# Patient Record
Sex: MALE | Race: WHITE
[De-identification: names, ages, dates, MRNs, and addresses within clinical notes are randomized per-mention and may not be internally consistent; named-entity substitution may affect disease eponyms.]

---

## 2021-06-11 ENCOUNTER — HOSPITAL ENCOUNTER (EMERGENCY)
Dept: HOSPITAL 8 - ED | Age: 31
LOS: 1 days | Discharge: PSYCHIATRIC OR SUBSTANCE USE FACILITY | End: 2021-06-12
Payer: MEDICAID

## 2021-06-11 VITALS — WEIGHT: 165.35 LBS | BODY MASS INDEX: 24.49 KG/M2 | HEIGHT: 69 IN

## 2021-06-11 DIAGNOSIS — R45.851: Primary | ICD-10-CM

## 2021-06-11 DIAGNOSIS — F48.2: ICD-10-CM

## 2021-06-11 DIAGNOSIS — R94.31: ICD-10-CM

## 2021-06-11 DIAGNOSIS — Z20.822: ICD-10-CM

## 2021-06-11 LAB
ALBUMIN SERPL-MCNC: 3.9 G/DL (ref 3.4–5)
ALP SERPL-CCNC: 128 U/L (ref 45–117)
ALT SERPL-CCNC: 34 U/L (ref 12–78)
ANION GAP SERPL CALC-SCNC: 5 MMOL/L (ref 5–15)
BASOPHILS # BLD AUTO: 0.1 X10^3/UL (ref 0–0.1)
BASOPHILS NFR BLD AUTO: 1 % (ref 0–1)
BILIRUB SERPL-MCNC: 0.3 MG/DL (ref 0.2–1)
CALCIUM SERPL-MCNC: 9.1 MG/DL (ref 8.5–10.1)
CHLORIDE SERPL-SCNC: 108 MMOL/L (ref 98–107)
CREAT SERPL-MCNC: 0.8 MG/DL (ref 0.7–1.3)
EOSINOPHIL # BLD AUTO: 0.6 X10^3/UL (ref 0–0.4)
EOSINOPHIL NFR BLD AUTO: 6 % (ref 1–7)
ERYTHROCYTE [DISTWIDTH] IN BLOOD BY AUTOMATED COUNT: 17.4 % (ref 9.4–14.8)
LYMPHOCYTES # BLD AUTO: 1.8 X10^3/UL (ref 1–3.4)
LYMPHOCYTES NFR BLD AUTO: 20 % (ref 22–44)
MCH RBC QN AUTO: 32.3 PG (ref 27.5–34.5)
MCHC RBC AUTO-ENTMCNC: 34.3 G/DL (ref 33.2–36.2)
MONOCYTES # BLD AUTO: 0.5 X10^3/UL (ref 0.2–0.8)
MONOCYTES NFR BLD AUTO: 5 % (ref 2–9)
NEUTROPHILS # BLD AUTO: 6.1 X10^3/UL (ref 1.8–6.8)
NEUTROPHILS NFR BLD AUTO: 68 % (ref 42–75)
PLATELET # BLD AUTO: 218 X10^3/UL (ref 130–400)
PMV BLD AUTO: 8.7 FL (ref 7.4–10.4)
PROT SERPL-MCNC: 7.8 G/DL (ref 6.4–8.2)
RBC # BLD AUTO: 4.04 X10^6/UL (ref 4.38–5.82)
VANCOMYCIN TROUGH SERPL-MCNC: < 1.7 MG/DL (ref 2.8–20)

## 2021-06-11 PROCEDURE — 80320 DRUG SCREEN QUANTALCOHOLS: CPT

## 2021-06-11 PROCEDURE — 80053 COMPREHEN METABOLIC PANEL: CPT

## 2021-06-11 PROCEDURE — 87426 SARSCOV CORONAVIRUS AG IA: CPT

## 2021-06-11 PROCEDURE — 80299 QUANTITATIVE ASSAY DRUG: CPT

## 2021-06-11 PROCEDURE — 93005 ELECTROCARDIOGRAM TRACING: CPT

## 2021-06-11 PROCEDURE — 99285 EMERGENCY DEPT VISIT HI MDM: CPT

## 2021-06-11 PROCEDURE — 80307 DRUG TEST PRSMV CHEM ANLYZR: CPT

## 2021-06-11 PROCEDURE — 80329 ANALGESICS NON-OPIOID 1 OR 2: CPT

## 2021-06-11 PROCEDURE — 36415 COLL VENOUS BLD VENIPUNCTURE: CPT

## 2021-06-11 PROCEDURE — G0480 DRUG TEST DEF 1-7 CLASSES: HCPCS

## 2021-06-11 PROCEDURE — 85025 COMPLETE CBC W/AUTO DIFF WBC: CPT

## 2021-06-11 NOTE — NUR
PT VOMITTED IN ENMESIS BAG AND BLANKETS. GIVEN NEW BLANKETS. PT FEELING BETTER 
NOW. NO ADDITIONAL NEEDS AT THIS TIME.

## 2021-06-11 NOTE — NUR
PT SLEEPING IN BED. NADN. NO MORE VOMITTING. BREATHING EVEN AND UNLABORED.

SITTER AT BEDSIDE FOR SFAETY MONITORING. BED IN LOW POSITION. RAILS ENGAGED.

CALL LIGHT ON LAP. WCTM.

## 2021-06-11 NOTE — NUR
PT GIVEN WATER. REFUSED FOOD WHEN OFFERED TO PT.

PT RESTING IN BED. BREATHING EVEN AND UNLABORED. NADN.

RAILS ENGAGED. BED IN LOW POSITION. SAFETY GARAGE DOORS SECURED.

SITTER OUTSIDE ROOM FOR SAFETY MONITORING. CALL LIGHT ON LAP.

NO ADDITIONAL NEEDS OR QUESTIONS AT THIS TIME. TM

## 2021-06-11 NOTE — NUR
DORINDA STERLING DROPPED PT OFF. CALL WHEN RELEASED TO BE PICKED UP. 

[phone] [facility], ASK FOR DORINDA

## 2021-06-11 NOTE — NUR
PT SLEEPING IN BED COMFORTABLY. NADN. BREATHING EVEN AND UNLABORED.

SITTER AT BEDSIDE FOR SAFETY MONITORING. BED IN LOW POSITION. RAILS ENGAGED.

CALL LIGHT ON LAP. WCTM.

## 2021-06-11 NOTE — NUR
PT C/O OF SI AND DENIES HI.  PT REPORTS PLAN OF HURTING HIMSELD BY PLANS OF 
JUMPING OUT OF A WINDOW. STATES HE HAS HAD NO INTENTION OF CARRYING OUT WITH 
THIS PLAN AND THAT THEY ARE JUST THOUGHTS. STATES HE IS HEARING VOICES "TALKING 
SHIT TO ME" 

REPORTS BEING VERY DEPRESSED ABOUT REHAB AND ABOUT 2 KIDS HE HAS.



PT STATES PAST SUICIDE ATTEMPT WAS 20 YEARS AGO OF SLITTING HIS WRIST. 

HX OF CIRRHOSIS, BIPOLAR, SCHIZ, AND DEPRESSION. 

PT IS FROM [place] AND HAS BEEN IS [place] [facility] PAST 5 DAYS [facility].

REPORTS REHAB OF 7 DAYS WITHOUT DRINKING, DOING METH, AND SMOKING MARIJUANA



PT VITALS ALL STABLE, SITTER AT BEDSIDE. BELONGINGS LOCKED IN LOCKERS AND FPORM 
COMPLETED SIGNED BY PT. SAFETY GARAGE DOORS CLOSED. BED IN LOW POSITION. RAILS 
ENGAGED. CALL LIGHT ON LAP. [facility]

## 2021-06-12 ENCOUNTER — HOSPITAL ENCOUNTER (INPATIENT)
Dept: HOSPITAL 8 - 3E | Age: 31
LOS: 5 days | Discharge: HOME | DRG: 885 | End: 2021-06-17
Attending: PSYCHIATRY & NEUROLOGY | Admitting: PSYCHIATRY & NEUROLOGY
Payer: MEDICAID

## 2021-06-12 VITALS — DIASTOLIC BLOOD PRESSURE: 67 MMHG | SYSTOLIC BLOOD PRESSURE: 106 MMHG

## 2021-06-12 VITALS — BODY MASS INDEX: 21.19 KG/M2 | WEIGHT: 143.08 LBS | HEIGHT: 69 IN

## 2021-06-12 VITALS — DIASTOLIC BLOOD PRESSURE: 69 MMHG | SYSTOLIC BLOOD PRESSURE: 107 MMHG

## 2021-06-12 VITALS — SYSTOLIC BLOOD PRESSURE: 116 MMHG | DIASTOLIC BLOOD PRESSURE: 71 MMHG

## 2021-06-12 DIAGNOSIS — G89.29: ICD-10-CM

## 2021-06-12 DIAGNOSIS — F12.10: ICD-10-CM

## 2021-06-12 DIAGNOSIS — G47.00: ICD-10-CM

## 2021-06-12 DIAGNOSIS — F10.21: ICD-10-CM

## 2021-06-12 DIAGNOSIS — Z23: ICD-10-CM

## 2021-06-12 DIAGNOSIS — F25.0: Primary | ICD-10-CM

## 2021-06-12 DIAGNOSIS — F17.210: ICD-10-CM

## 2021-06-12 DIAGNOSIS — F11.20: ICD-10-CM

## 2021-06-12 DIAGNOSIS — F15.21: ICD-10-CM

## 2021-06-12 DIAGNOSIS — F41.9: ICD-10-CM

## 2021-06-12 LAB — MICROSCOPIC: (no result)

## 2021-06-12 PROCEDURE — 80061 LIPID PANEL: CPT

## 2021-06-12 PROCEDURE — 93005 ELECTROCARDIOGRAM TRACING: CPT

## 2021-06-12 PROCEDURE — 36415 COLL VENOUS BLD VENIPUNCTURE: CPT

## 2021-06-12 PROCEDURE — 80320 DRUG SCREEN QUANTALCOHOLS: CPT

## 2021-06-12 PROCEDURE — 99285 EMERGENCY DEPT VISIT HI MDM: CPT

## 2021-06-12 PROCEDURE — 80053 COMPREHEN METABOLIC PANEL: CPT

## 2021-06-12 PROCEDURE — G0480 DRUG TEST DEF 1-7 CLASSES: HCPCS

## 2021-06-12 PROCEDURE — 80299 QUANTITATIVE ASSAY DRUG: CPT

## 2021-06-12 PROCEDURE — 71045 X-RAY EXAM CHEST 1 VIEW: CPT

## 2021-06-12 PROCEDURE — 80307 DRUG TEST PRSMV CHEM ANLYZR: CPT

## 2021-06-12 PROCEDURE — 85025 COMPLETE CBC W/AUTO DIFF WBC: CPT

## 2021-06-12 PROCEDURE — 81003 URINALYSIS AUTO W/O SCOPE: CPT

## 2021-06-12 PROCEDURE — 91303: CPT

## 2021-06-12 PROCEDURE — 80329 ANALGESICS NON-OPIOID 1 OR 2: CPT

## 2021-06-12 PROCEDURE — 87426 SARSCOV CORONAVIRUS AG IA: CPT

## 2021-06-12 RX ADMIN — TRAZODONE HYDROCHLORIDE PRN MG: 50 TABLET ORAL at 20:14

## 2021-06-12 RX ADMIN — NICOTINE PRN PATCH: 14 PATCH, EXTENDED RELEASE TRANSDERMAL at 18:20

## 2021-06-12 NOTE — NUR
CALL TO LAB TO INQUIRE ON DELAY OF RESULT OF COVID.  LAB DELAY, ESTIMATED TIME 
FOR RESULT 45 MINUTES.

## 2021-06-12 NOTE — NUR
BREAK RN: PT RESTING COMFORTABLY IN [facility], [facility], NO CHANGE IN CONDITION, LIGHTS 
OFF FOR COMFORT, ROOM SECURED, SITTER IN LINE OF SIGHT. WCTM

## 2021-06-12 NOTE — NUR
PT SLEEPING IN BED. EYES CLOSED. NO CONDITION CHANGE. BREATHING EVEN AND 
UNLABORED. NADN. SITTER IN LINE OF SIGHT FOR SAFETY MONITORING. GARAGE DOORS 
SECURED.

BED IN LOW POSIITON. RAILS ENGAGED. CALL LIGHT ON LAP. [facility]BECA.

## 2021-06-12 NOTE — NUR
PT GIVEN HOSPITAL BED FOR INCREASED COMFORTABILITY.

PT RESTING IN BED WATCHING TV. 

SITTER OUTSIDE ROOM FOR SAFETY MONITORING. BREATHING EVEN AND UNLABORED.

NADN. BED IN LOW POSITION. RAILS ENGAGED. CALL LIGHT ON LAP. GARAGE DOORS 
SECURED.

WCTM.

## 2021-06-12 NOTE — NUR
MEAL TRAY PROVIDED.  PT DENIES SI, AH, VH AT THIS TIME.  WHEN ASKED IF PT WOULD 
LIKE TO GO BACK TO [facility], PT STATES "I WANT TO CHECK OUT THE OTHER 
PL[facility]".  PT COOPERATIVE WITH CARE.  VS RECHECKED/UPDATED 
IN COMPUTER.  PHYSICAL REASSESSMENT COMPLETE.

## 2021-06-12 NOTE — NUR
THROUGHPUT: DENIED BY [facility] [[email or web address]] D/T INS PER [place]; PACKET FAXED TO [facility].

## 2021-06-12 NOTE — NUR
PT A POOR HISTORIAN FOR DAILY MEDICATIONS. STATES HE TAKES ONE MED FOR 
PSYCH/DEPRESSION BUT DOES NOT KNOW WHAT IT IS.

## 2021-06-12 NOTE — NUR
THROUGHPUT:  PT HAS MEDICAID INSURANCE PER [employer], FAXED REFERAL TO [facility].

## 2021-06-12 NOTE — NUR
AWAITING [facility] AVAILABILITY.  PT CALM, COOPERATIVE WITH CARE.  UP AS NEEDED TO 
BR, SLEEPING INTERMITTENTLY.  SITTER AT DOORWAY.

## 2021-06-13 VITALS — SYSTOLIC BLOOD PRESSURE: 116 MMHG | DIASTOLIC BLOOD PRESSURE: 70 MMHG

## 2021-06-13 VITALS — DIASTOLIC BLOOD PRESSURE: 69 MMHG | SYSTOLIC BLOOD PRESSURE: 102 MMHG

## 2021-06-13 LAB
CHOL/HDL RATIO: 2.5
HDL CHOL %: 39 % (ref 26–37)
HDL CHOLESTEROL (DIRECT): 61 MG/DL (ref 40–60)
LDL CHOLESTEROL,CALCULATED: 83 MG/DL (ref 54–169)
LDLC/HDLC SERPL: 1.4 {RATIO} (ref 0.5–3)
TRIGL SERPL-MCNC: 54 MG/DL (ref 50–200)
VLDLC SERPL CALC-MCNC: 11 MG/DL (ref 0–25)

## 2021-06-13 RX ADMIN — QUETIAPINE FUMARATE SCH MG: 25 TABLET ORAL at 20:36

## 2021-06-13 RX ADMIN — OLANZAPINE SCH MG: 10 TABLET, FILM COATED ORAL at 08:51

## 2021-06-13 RX ADMIN — NICOTINE PRN PATCH: 14 PATCH, EXTENDED RELEASE TRANSDERMAL at 20:37

## 2021-06-13 RX ADMIN — GABAPENTIN SCH MG: 300 CAPSULE ORAL at 16:28

## 2021-06-13 RX ADMIN — CITALOPRAM HYDROBROMIDE SCH MG: 20 TABLET ORAL at 13:51

## 2021-06-13 RX ADMIN — GABAPENTIN SCH MG: 300 CAPSULE ORAL at 20:36

## 2021-06-14 VITALS — DIASTOLIC BLOOD PRESSURE: 70 MMHG | SYSTOLIC BLOOD PRESSURE: 103 MMHG

## 2021-06-14 VITALS — SYSTOLIC BLOOD PRESSURE: 112 MMHG | DIASTOLIC BLOOD PRESSURE: 72 MMHG

## 2021-06-14 RX ADMIN — QUETIAPINE FUMARATE SCH MG: 25 TABLET ORAL at 20:19

## 2021-06-14 RX ADMIN — GABAPENTIN SCH MG: 300 CAPSULE ORAL at 16:17

## 2021-06-14 RX ADMIN — OLANZAPINE SCH MG: 10 TABLET, FILM COATED ORAL at 09:02

## 2021-06-14 RX ADMIN — CITALOPRAM HYDROBROMIDE SCH MG: 20 TABLET ORAL at 09:02

## 2021-06-14 RX ADMIN — GABAPENTIN SCH MG: 300 CAPSULE ORAL at 20:19

## 2021-06-14 RX ADMIN — GABAPENTIN SCH MG: 300 CAPSULE ORAL at 09:02

## 2021-06-14 RX ADMIN — TRAZODONE HYDROCHLORIDE PRN MG: 50 TABLET ORAL at 20:19

## 2021-06-15 VITALS — DIASTOLIC BLOOD PRESSURE: 68 MMHG | SYSTOLIC BLOOD PRESSURE: 109 MMHG

## 2021-06-15 VITALS — DIASTOLIC BLOOD PRESSURE: 64 MMHG | SYSTOLIC BLOOD PRESSURE: 105 MMHG

## 2021-06-15 RX ADMIN — GABAPENTIN SCH MG: 300 CAPSULE ORAL at 20:38

## 2021-06-15 RX ADMIN — DOXEPIN HYDROCHLORIDE SCH MG: 25 CAPSULE ORAL at 20:38

## 2021-06-15 RX ADMIN — GABAPENTIN SCH MG: 300 CAPSULE ORAL at 08:22

## 2021-06-15 RX ADMIN — QUETIAPINE FUMARATE SCH MG: 25 TABLET ORAL at 20:38

## 2021-06-15 RX ADMIN — NICOTINE PRN PATCH: 14 PATCH, EXTENDED RELEASE TRANSDERMAL at 10:41

## 2021-06-15 RX ADMIN — OLANZAPINE SCH MG: 10 TABLET, FILM COATED ORAL at 08:22

## 2021-06-15 RX ADMIN — GABAPENTIN SCH MG: 300 CAPSULE ORAL at 16:46

## 2021-06-15 RX ADMIN — CITALOPRAM HYDROBROMIDE SCH MG: 20 TABLET ORAL at 08:22

## 2021-06-16 VITALS — DIASTOLIC BLOOD PRESSURE: 70 MMHG | SYSTOLIC BLOOD PRESSURE: 110 MMHG

## 2021-06-16 VITALS — SYSTOLIC BLOOD PRESSURE: 107 MMHG | DIASTOLIC BLOOD PRESSURE: 60 MMHG

## 2021-06-16 RX ADMIN — QUETIAPINE FUMARATE SCH MG: 25 TABLET ORAL at 20:45

## 2021-06-16 RX ADMIN — ACETAMINOPHEN PRN MG: 325 TABLET, FILM COATED ORAL at 14:11

## 2021-06-16 RX ADMIN — NICOTINE PRN PATCH: 14 PATCH, EXTENDED RELEASE TRANSDERMAL at 07:30

## 2021-06-16 RX ADMIN — OLANZAPINE SCH MG: 10 TABLET, FILM COATED ORAL at 07:30

## 2021-06-16 RX ADMIN — GABAPENTIN SCH MG: 300 CAPSULE ORAL at 07:30

## 2021-06-16 RX ADMIN — GABAPENTIN SCH MG: 300 CAPSULE ORAL at 16:03

## 2021-06-16 RX ADMIN — DOXEPIN HYDROCHLORIDE SCH MG: 25 CAPSULE ORAL at 20:45

## 2021-06-16 RX ADMIN — GABAPENTIN SCH MG: 300 CAPSULE ORAL at 20:44

## 2021-06-16 RX ADMIN — CITALOPRAM HYDROBROMIDE SCH MG: 20 TABLET ORAL at 07:30

## 2021-06-17 VITALS — DIASTOLIC BLOOD PRESSURE: 70 MMHG | SYSTOLIC BLOOD PRESSURE: 109 MMHG

## 2021-06-17 RX ADMIN — CITALOPRAM HYDROBROMIDE SCH MG: 20 TABLET ORAL at 08:39

## 2021-06-17 RX ADMIN — OLANZAPINE SCH MG: 10 TABLET, FILM COATED ORAL at 08:39

## 2021-06-17 RX ADMIN — GABAPENTIN SCH MG: 300 CAPSULE ORAL at 08:39

## 2021-06-17 RX ADMIN — NICOTINE PRN PATCH: 14 PATCH, EXTENDED RELEASE TRANSDERMAL at 12:13

## 2021-06-17 RX ADMIN — ACETAMINOPHEN PRN MG: 325 TABLET, FILM COATED ORAL at 07:50
